# Patient Record
Sex: FEMALE | Race: WHITE | Employment: FULL TIME | URBAN - METROPOLITAN AREA
[De-identification: names, ages, dates, MRNs, and addresses within clinical notes are randomized per-mention and may not be internally consistent; named-entity substitution may affect disease eponyms.]

---

## 2018-10-26 ENCOUNTER — OFFICE VISIT (OUTPATIENT)
Dept: FAMILY MEDICINE CLINIC | Age: 28
End: 2018-10-26
Payer: COMMERCIAL

## 2018-10-26 VITALS
TEMPERATURE: 98.4 F | WEIGHT: 165 LBS | DIASTOLIC BLOOD PRESSURE: 70 MMHG | HEART RATE: 94 BPM | BODY MASS INDEX: 30.36 KG/M2 | HEIGHT: 62 IN | SYSTOLIC BLOOD PRESSURE: 122 MMHG | RESPIRATION RATE: 14 BRPM | OXYGEN SATURATION: 98 %

## 2018-10-26 DIAGNOSIS — F43.10 POST TRAUMATIC STRESS DISORDER (PTSD): Chronic | ICD-10-CM

## 2018-10-26 PROBLEM — E28.2 PCOS (POLYCYSTIC OVARIAN SYNDROME): Chronic | Status: ACTIVE | Noted: 2018-10-26

## 2018-10-26 PROCEDURE — 99203 OFFICE O/P NEW LOW 30 MIN: CPT | Performed by: FAMILY MEDICINE

## 2018-10-26 RX ORDER — ADAPALENE AND BENZOYL PEROXIDE 3; 25 MG/G; MG/G
GEL TOPICAL
Refills: 1 | COMMUNITY
Start: 2018-10-25

## 2018-10-26 RX ORDER — FLUOXETINE 10 MG/1
CAPSULE ORAL
Refills: 0 | COMMUNITY
Start: 2018-10-25 | End: 2018-10-26 | Stop reason: SDUPTHER

## 2018-10-26 RX ORDER — FLUOXETINE 10 MG/1
10 CAPSULE ORAL DAILY
Qty: 30 CAPSULE | Refills: 5 | Status: SHIPPED | OUTPATIENT
Start: 2018-10-26

## 2018-10-26 RX ORDER — DOXYCYCLINE HYCLATE 100 MG
TABLET ORAL
Refills: 0 | COMMUNITY
Start: 2018-10-24

## 2018-10-26 ASSESSMENT — PATIENT HEALTH QUESTIONNAIRE - PHQ9
2. FEELING DOWN, DEPRESSED OR HOPELESS: 0
1. LITTLE INTEREST OR PLEASURE IN DOING THINGS: 0
SUM OF ALL RESPONSES TO PHQ QUESTIONS 1-9: 0
SUM OF ALL RESPONSES TO PHQ9 QUESTIONS 1 & 2: 0
SUM OF ALL RESPONSES TO PHQ QUESTIONS 1-9: 0

## 2018-10-26 NOTE — PROGRESS NOTES
for this visit. PMH, Surgical Hx, Family Hx, and Social Hxreviewed and updated. Health Maintenance reviewed. Objective    Vitals:    10/26/18 1205   BP: 122/70   Site: Left Upper Arm   Position: Sitting   Cuff Size: Medium Adult   Pulse: 94   Resp: 14   Temp: 98.4 °F (36.9 °C)   TempSrc: Temporal   SpO2: 98%   Weight: 165 lb (74.8 kg)   Height: 5' 2\" (1.575 m)       Physical Exam   Constitutional: She is oriented to person, place, and time. She appears well-developed and well-nourished. HENT:   Head: Normocephalic. Eyes: Conjunctivae are normal.   Pulmonary/Chest: Effort normal.   Neurological: She is alert and oriented to person, place, and time. Psychiatric: She has a normal mood and affect. Her behavior is normal. Judgment and thought content normal.         No results found for: LABA1C  No results found for: LABMICR, CREATININE  No results found for: ALT, AST  No results found for: CHOL, TRIG, HDL, LDLCALC, LDLDIRECT     Assessment & Plan   Visit Diagnoses and Associated Orders     Post traumatic stress disorder (PTSD)        Patient will continue therapy telephonically with her current psychologist.  Follow clinically. Reassess one month. FLUoxetine (PROZAC) 10 MG capsule [[id number]]           ORDERS WITHOUT AN ASSOCIATED DIAGNOSIS    doxycycline hyclate (VIBRA-TABS) 100 MG tablet [[id number]]      EPIDUO FORTE 0.3-2.5 % GEL [[id number]]              Reviewed with the patient: all disease processes, current clinical status, medications, activities and diet.      Side effects, adverse effects of the medication prescribed today, as well as treatment plan/ rationale and result expectations have been discussed with the patient who expresses understanding and desires to proceed.     Close follow up to evaluate treatment results and for coordination of care. I have reviewed the patient's medical history in detail and updated the computerized patient record.     More than 50% of the appointment was spent in

## 2024-06-25 ENCOUNTER — HOSPITAL ENCOUNTER (EMERGENCY)
Facility: HOSPITAL | Age: 34
Discharge: HOME | End: 2024-06-25
Attending: STUDENT IN AN ORGANIZED HEALTH CARE EDUCATION/TRAINING PROGRAM
Payer: COMMERCIAL

## 2024-06-25 VITALS
DIASTOLIC BLOOD PRESSURE: 78 MMHG | SYSTOLIC BLOOD PRESSURE: 124 MMHG | WEIGHT: 185 LBS | OXYGEN SATURATION: 100 % | RESPIRATION RATE: 20 BRPM | HEIGHT: 62 IN | TEMPERATURE: 97.9 F | HEART RATE: 82 BPM | BODY MASS INDEX: 34.04 KG/M2

## 2024-06-25 DIAGNOSIS — S70.11XA HEMATOMA OF RIGHT THIGH, INITIAL ENCOUNTER: Primary | ICD-10-CM

## 2024-06-25 DIAGNOSIS — R23.8 SKIN BREAKDOWN: ICD-10-CM

## 2024-06-25 PROCEDURE — 99283 EMERGENCY DEPT VISIT LOW MDM: CPT

## 2024-06-25 PROCEDURE — 99284 EMERGENCY DEPT VISIT MOD MDM: CPT | Performed by: STUDENT IN AN ORGANIZED HEALTH CARE EDUCATION/TRAINING PROGRAM

## 2024-06-25 RX ORDER — AMOXICILLIN AND CLAVULANATE POTASSIUM 875; 125 MG/1; MG/1
1 TABLET, FILM COATED ORAL EVERY 12 HOURS
Qty: 14 TABLET | Refills: 0 | Status: SHIPPED | OUTPATIENT
Start: 2024-06-25 | End: 2024-07-02

## 2024-06-25 ASSESSMENT — PAIN - FUNCTIONAL ASSESSMENT: PAIN_FUNCTIONAL_ASSESSMENT: 0-10

## 2024-06-25 ASSESSMENT — LIFESTYLE VARIABLES
HAVE PEOPLE ANNOYED YOU BY CRITICIZING YOUR DRINKING: NO
HAVE YOU EVER FELT YOU SHOULD CUT DOWN ON YOUR DRINKING: NO
EVER FELT BAD OR GUILTY ABOUT YOUR DRINKING: NO

## 2024-06-25 ASSESSMENT — PAIN DESCRIPTION - DESCRIPTORS
DESCRIPTORS: BURNING
DESCRIPTORS: BURNING

## 2024-06-25 ASSESSMENT — PAIN DESCRIPTION - ORIENTATION: ORIENTATION: RIGHT

## 2024-06-25 ASSESSMENT — PAIN SCALES - GENERAL: PAINLEVEL_OUTOF10: 8

## 2024-06-25 ASSESSMENT — PAIN DESCRIPTION - PROGRESSION: CLINICAL_PROGRESSION: GRADUALLY WORSENING

## 2024-06-25 ASSESSMENT — PAIN DESCRIPTION - PAIN TYPE: TYPE: ACUTE PAIN

## 2024-06-25 ASSESSMENT — COLUMBIA-SUICIDE SEVERITY RATING SCALE - C-SSRS
1. IN THE PAST MONTH, HAVE YOU WISHED YOU WERE DEAD OR WISHED YOU COULD GO TO SLEEP AND NOT WAKE UP?: NO
2. HAVE YOU ACTUALLY HAD ANY THOUGHTS OF KILLING YOURSELF?: NO
6. HAVE YOU EVER DONE ANYTHING, STARTED TO DO ANYTHING, OR PREPARED TO DO ANYTHING TO END YOUR LIFE?: NO

## 2024-06-25 ASSESSMENT — PAIN DESCRIPTION - FREQUENCY: FREQUENCY: CONSTANT/CONTINUOUS

## 2024-06-25 ASSESSMENT — PAIN DESCRIPTION - LOCATION: LOCATION: LEG

## 2024-06-25 NOTE — DISCHARGE INSTRUCTIONS
Please keep the area where the skin has broken down clean, dry, and open to air when you are at home.  You may cover this with nonstick dressings while you are at work to prevent friction.  If you develop fevers, if the limb becomes hard, hot, swollen, red, or if it becomes cold and white or if you lose feeling, or if you become unable to move it, or if you develop tracking redness of the limb, please return to the emergency department immediately for reevaluation.

## 2024-06-25 NOTE — ED PROVIDER NOTES
EMERGENCY DEPARTMENT ENCOUNTER      Pt Name: Daija Camejo  MRN: [id number]  Birthdate 1990  Date of evaluation: 6/25/2024  Provider: Marlin Sierra DO    CHIEF COMPLAINT       Chief Complaint   Patient presents with    Leg Pain     Right upper leg hit by hose, ice applied possible contact wit propane          HISTORY OF PRESENT ILLNESS    HPI   34-year-old female who presents to the emergency department with concerns for bruising and blistering of the patch on her leg where she was struck with a hose on Saturday.  She states that she was trying to remove a mole from her garden, and the hose accidentally flung back and hit her fairly hard in the thigh she developed a significant amount of swelling and bruising, and she has been treating this with ice however she put the ice in her pocket while she was at work and thinks that she may have lifted in for too long and developed skin breakdown over the top of it.  Today she presents with concerns for infection because she had some white discharge and tenderness of the areas where skin is broken down.  No fevers, neurovascularly intact distal to the injury.          Nursing Notes were reviewed.       PAST MEDICAL HISTORY   Patient History   No past medical history on file.      SURGICAL HISTORY       Past Surgical History:   Procedure Laterality Date    CT GUIDED ABSCESS FLUID COLLECTION DRAINAGE  12/30/2021    CT GUIDED ABSCESS FLUID COLLECTION DRAINAGE 12/30/2021 [facility]    OTHER SURGICAL HISTORY  12/27/2021    Appendectomy    OTHER SURGICAL HISTORY  12/27/2021    Breast reconstruction with prosthesis         CURRENT MEDICATIONS       Previous Medications    No medications on file       ALLERGIES     Patient has no known allergies.    FAMILY HISTORY     No family history on file.       SOCIAL HISTORY       Social History     Socioeconomic History    Marital status: Single     Spouse name: Not on file    Number of children: Not on file    Years of  education: Not on file    Highest education level: Not on file   Occupational History    Not on file   Tobacco Use    Smoking status: Not on file    Smokeless tobacco: Not on file   Substance and Sexual Activity    Alcohol use: Not on file    Drug use: Not on file    Sexual activity: Not on file   Other Topics Concern    Not on file   Social History Narrative    Not on file     Social Determinants of Health     Financial Resource Strain: Not on file   Food Insecurity: Not on file   Transportation Needs: Not on file   Physical Activity: Not on file   Stress: Not on file   Social Connections: Not on file   Intimate Partner Violence: Not on file   Housing Stability: Not on file       SCREENINGS                             PHYSICAL EXAM    (up to 7 for level 4, 8 or more for level 5)   Physical Exam   ED Triage Vitals [06/25/24 0107]   Temperature Heart Rate Respirations BP   36.6 °C (97.9 °F) 84 18 130/84      Pulse Ox Temp Source Heart Rate Source Patient Position   99 % Temporal Monitor Sitting      BP Location FiO2 (%)     Right arm --       Physical Exam  Vitals and nursing note reviewed.   Constitutional:       General: She is not in acute distress.     Appearance: She is well-developed.   HENT:      Head: Normocephalic and atraumatic.   Eyes:      Conjunctiva/sclera: Conjunctivae normal.   Cardiovascular:      Rate and Rhythm: Normal rate and regular rhythm.      Heart sounds: No murmur heard.  Pulmonary:      Effort: Pulmonary effort is normal. No respiratory distress.      Breath sounds: Normal breath sounds.   Abdominal:      Palpations: Abdomen is soft.      Tenderness: There is no abdominal tenderness.   Musculoskeletal:      Cervical back: Neck supple.      Comments: No edema distal to the site of injury, pulses and sensation and capillary refill are intact distal to the injury.  Compartments are soft   Skin:     General: Skin is warm and dry.      Capillary Refill: Capillary refill takes less than 2  seconds.      Findings: Bruising and rash present.      Comments: Large approximately 15 cm hematoma on the anterior right thigh with 5 cm x 3 cm fluid-filled blister.  There are 2 areas of maceration, and epidermal breakdown, but dermis remains intact.  No active bleeding.   Neurological:      Mental Status: She is alert.   Psychiatric:         Mood and Affect: Mood normal.          DIAGNOSTIC RESULTS     LABS:  Labs Reviewed - No data to display    All other labs were within normal range or not returned as of this dictation.    Imaging  No orders to display           Procedures  Procedures     EMERGENCY DEPARTMENT COURSE/MDM:   Daija Camejo is a 34 y.o. female presenting to the ED for evaluation of had concerns including Leg Pain (Right upper leg hit by hose, ice applied possible contact wit propane )..   Medical Decision Making  34-year-old female presents for evaluation of hematoma that is progressing, and has developed overlying areas of skin breakdown and blistering.  The intact blister was incised, fluid allowed to drain, but was not unroofed.  2 small 1.5 cm areas of epidermal breakdown consistent with patient's reported overuse of ice, may be complicated by the dressing she has had over it.  Educated regarding care and nonstick dressings while she is close, and recommended she keep this open to air.  Educated regarding signs and symptoms of infection and how healing will likely progress for the bruising portion of this injury.  Patient verbalized understanding, prescription for Augmentin was sent to her pharmacy, she is instructed to pick it up and use it should she develop signs and symptoms of infection but verbalized understanding that she did not need to take it unless she developed those signs.  Discharged in stable condition        Diagnoses as of 06/26/24 1910   Hematoma of right thigh, initial encounter   Skin breakdown          External records reviewed: I reviewed external records including  outpatient, PCP records, and prior discharge summaries    I have reviewed this case with the ED attending physician, and the attending agrees with the plan. Patient or family was counselled regarding labs, imaging, likely diagnosis, and plan. All questions were answered.     Marlin Sierra DO  PGY-4, emergency medicine    The above documentation was completed with the use of speech recognition software. It may contain dictation errors secondary to limitations of the software.      ED Medications administered this visit:  Medications - No data to display    New Prescriptions from this visit:    New Prescriptions    No medications on file       Final Impression: No diagnosis found.      (Please note that portions of this note were completed with a voice recognition program.  Efforts were made to edit the dictations but occasionally words are mis-transcribed.)     Marlin Sierra DO  Resident  06/26/24 2488

## 2025-02-26 ENCOUNTER — TRANSCRIBE ORDERS (OUTPATIENT)
Dept: ADMINISTRATIVE | Age: 35
End: 2025-02-26

## 2025-02-26 DIAGNOSIS — S20.109A: Primary | ICD-10-CM

## 2025-03-17 ENCOUNTER — TELEPHONE (OUTPATIENT)
Dept: WOMENS IMAGING | Age: 35
End: 2025-03-17

## 2025-03-17 ENCOUNTER — TRANSCRIBE ORDERS (OUTPATIENT)
Dept: ADMINISTRATIVE | Age: 35
End: 2025-03-17

## 2025-03-17 DIAGNOSIS — S20.109A: Primary | ICD-10-CM

## 2025-03-17 NOTE — TELEPHONE ENCOUNTER
03/17/25  Patient called to schedule her MRI of breast. Reviewed questions and also what to expect when having the MRI.  Patient is now scheduled for Friday, 3/21/25 to arrive at 1015 for 1115 appointment.  We are working on obtaining her previous mammogram 1-2 yrs ago from [facility]. Encouraged to call with questions or concerns.

## 2025-04-21 ENCOUNTER — HOSPITAL ENCOUNTER (OUTPATIENT)
Dept: MRI IMAGING | Age: 35
Discharge: HOME | End: 2025-04-23
Attending: PLASTIC SURGERY
Payer: COMMERCIAL

## 2025-04-21 DIAGNOSIS — S20.109A: ICD-10-CM

## 2025-04-21 PROCEDURE — 77047 MRI BREAST C- BILATERAL: CPT
